# Patient Record
Sex: FEMALE | Race: WHITE | NOT HISPANIC OR LATINO | Employment: FULL TIME | URBAN - METROPOLITAN AREA
[De-identification: names, ages, dates, MRNs, and addresses within clinical notes are randomized per-mention and may not be internally consistent; named-entity substitution may affect disease eponyms.]

---

## 2019-03-07 DIAGNOSIS — M06.9 RHEUMATOID ARTHRITIS, INVOLVING UNSPECIFIED SITE, UNSPECIFIED RHEUMATOID FACTOR PRESENCE: Primary | ICD-10-CM

## 2019-04-08 ENCOUNTER — TELEPHONE (OUTPATIENT)
Dept: RHEUMATOLOGY | Facility: CLINIC | Age: 54
End: 2019-04-08

## 2019-04-08 DIAGNOSIS — G47.00 INSOMNIA, UNSPECIFIED TYPE: Primary | ICD-10-CM

## 2019-04-08 RX ORDER — TEMAZEPAM 30 MG/1
30 CAPSULE ORAL NIGHTLY PRN
Qty: 30 CAPSULE | Refills: 3 | Status: SHIPPED | OUTPATIENT
Start: 2019-04-08 | End: 2019-05-08

## 2019-05-06 ENCOUNTER — OFFICE VISIT (OUTPATIENT)
Dept: RHEUMATOLOGY | Facility: CLINIC | Age: 54
End: 2019-05-06
Payer: COMMERCIAL

## 2019-05-06 VITALS
TEMPERATURE: 98 F | SYSTOLIC BLOOD PRESSURE: 120 MMHG | WEIGHT: 159 LBS | HEIGHT: 63 IN | RESPIRATION RATE: 16 BRPM | HEART RATE: 16 BPM | DIASTOLIC BLOOD PRESSURE: 80 MMHG | BODY MASS INDEX: 28.17 KG/M2

## 2019-05-06 DIAGNOSIS — M25.562 ACUTE PAIN OF LEFT KNEE: ICD-10-CM

## 2019-05-06 DIAGNOSIS — M06.9 RHEUMATOID ARTHRITIS, INVOLVING UNSPECIFIED SITE, UNSPECIFIED RHEUMATOID FACTOR PRESENCE: ICD-10-CM

## 2019-05-06 DIAGNOSIS — M79.7 FIBROSITIS: ICD-10-CM

## 2019-05-06 DIAGNOSIS — E55.9 VITAMIN D DEFICIENCY: ICD-10-CM

## 2019-05-06 DIAGNOSIS — K11.7 DISTURBANCE OF SALIVARY SECRETION: ICD-10-CM

## 2019-05-06 DIAGNOSIS — E87.6 HYPOKALEMIA: Primary | ICD-10-CM

## 2019-05-06 DIAGNOSIS — M75.52 BURSITIS OF LEFT SHOULDER: ICD-10-CM

## 2019-05-06 PROBLEM — B58.00 OCULAR TOXOPLASMOSIS: Status: ACTIVE | Noted: 2019-05-06

## 2019-05-06 PROBLEM — M70.60 TROCHANTERIC BURSITIS: Status: ACTIVE | Noted: 2019-05-06

## 2019-05-06 PROBLEM — M75.50 BURSITIS OF SHOULDER: Status: ACTIVE | Noted: 2019-05-06

## 2019-05-06 PROCEDURE — 99214 OFFICE O/P EST MOD 30 MIN: CPT | Mod: 25,S$GLB,, | Performed by: INTERNAL MEDICINE

## 2019-05-06 PROCEDURE — 3008F BODY MASS INDEX DOCD: CPT | Mod: CPTII,S$GLB,, | Performed by: INTERNAL MEDICINE

## 2019-05-06 PROCEDURE — 20610 DRAIN/INJ JOINT/BURSA W/O US: CPT | Mod: LT,S$GLB,, | Performed by: INTERNAL MEDICINE

## 2019-05-06 PROCEDURE — 99214 PR OFFICE/OUTPT VISIT, EST, LEVL IV, 30-39 MIN: ICD-10-PCS | Mod: 25,S$GLB,, | Performed by: INTERNAL MEDICINE

## 2019-05-06 PROCEDURE — 3008F PR BODY MASS INDEX (BMI) DOCUMENTED: ICD-10-PCS | Mod: CPTII,S$GLB,, | Performed by: INTERNAL MEDICINE

## 2019-05-06 PROCEDURE — 20610 PR DRAIN/INJECT LARGE JOINT/BURSA: ICD-10-PCS | Mod: LT,S$GLB,, | Performed by: INTERNAL MEDICINE

## 2019-05-06 RX ORDER — LEFLUNOMIDE 10 MG/1
TABLET ORAL
COMMUNITY

## 2019-05-06 RX ORDER — DULOXETIN HYDROCHLORIDE 30 MG/1
30 CAPSULE, DELAYED RELEASE ORAL DAILY
Refills: 3 | COMMUNITY
Start: 2019-04-22

## 2019-05-06 RX ORDER — METHYLPREDNISOLONE ACETATE 40 MG/ML
80 INJECTION, SUSPENSION INTRA-ARTICULAR; INTRALESIONAL; INTRAMUSCULAR; SOFT TISSUE
Status: COMPLETED | OUTPATIENT
Start: 2019-05-06 | End: 2019-05-06

## 2019-05-06 RX ORDER — OLMESARTAN MEDOXOMIL 40 MG/1
TABLET ORAL
Refills: 3 | COMMUNITY
Start: 2019-03-08

## 2019-05-06 RX ORDER — METAXALONE 800 MG/1
800 TABLET ORAL 3 TIMES DAILY
Refills: 0 | COMMUNITY
Start: 2019-04-23

## 2019-05-06 RX ORDER — BUTALBITAL, ACETAMINOPHEN AND CAFFEINE 300; 40; 50 MG/1; MG/1; MG/1
CAPSULE ORAL
COMMUNITY

## 2019-05-06 RX ORDER — PANTOPRAZOLE SODIUM 40 MG/1
TABLET, DELAYED RELEASE ORAL
Refills: 3 | COMMUNITY
Start: 2019-05-03 | End: 2019-07-06 | Stop reason: SDUPTHER

## 2019-05-06 RX ORDER — FOLIC ACID 1 MG/1
TABLET ORAL
Qty: 60 TABLET | Refills: 4 | Status: SHIPPED | OUTPATIENT
Start: 2019-05-06 | End: 2020-01-20

## 2019-05-06 RX ORDER — PREDNISONE 10 MG/1
TABLET ORAL
Refills: 4 | COMMUNITY
Start: 2019-04-04 | End: 2020-02-26 | Stop reason: SDUPTHER

## 2019-05-06 RX ORDER — SALSALATE 500 MG/1
TABLET, FILM COATED ORAL
COMMUNITY
End: 2019-07-06 | Stop reason: SDUPTHER

## 2019-05-06 RX ORDER — FOLIC ACID 1 MG/1
TABLET ORAL
Refills: 2 | COMMUNITY
Start: 2019-04-21 | End: 2019-05-06 | Stop reason: SDUPTHER

## 2019-05-06 RX ORDER — PREDNISONE 1 MG/1
TABLET ORAL
Refills: 3 | COMMUNITY
Start: 2019-05-03 | End: 2019-08-26 | Stop reason: SDUPTHER

## 2019-05-06 RX ORDER — HYDROCHLOROTHIAZIDE 12.5 MG/1
TABLET ORAL
Refills: 3 | COMMUNITY
Start: 2019-04-21

## 2019-05-06 RX ORDER — POTASSIUM CHLORIDE 750 MG/1
10 TABLET, EXTENDED RELEASE ORAL DAILY
Qty: 30 TABLET | Refills: 5 | Status: SHIPPED | OUTPATIENT
Start: 2019-05-06 | End: 2020-02-26 | Stop reason: SDUPTHER

## 2019-05-06 RX ORDER — MECLIZINE HYDROCHLORIDE 25 MG/1
TABLET ORAL
COMMUNITY

## 2019-05-06 RX ORDER — CARBOXYMETHYLCELLULOSE SODIUM 10 MG/ML
GEL OPHTHALMIC
COMMUNITY

## 2019-05-06 RX ORDER — LANOLIN ALCOHOL/MO/W.PET/CERES
CREAM (GRAM) TOPICAL
COMMUNITY

## 2019-05-06 RX ORDER — POTASSIUM CHLORIDE 750 MG/1
TABLET, EXTENDED RELEASE ORAL
COMMUNITY
End: 2019-05-06 | Stop reason: SDUPTHER

## 2019-05-06 RX ADMIN — METHYLPREDNISOLONE ACETATE 80 MG: 40 INJECTION, SUSPENSION INTRA-ARTICULAR; INTRALESIONAL; INTRAMUSCULAR; SOFT TISSUE at 10:05

## 2019-05-06 NOTE — PROGRESS NOTES
Subjective:       Patient ID: Amirah Lagos is a 53 y.o. female.    Chief Complaint: Follow-up (with labs)    HPI Continue on Xeljanz 5 mg BID , Leflunomide 10 mg a day n Protonic 40 and zantac at hs, Knee painful and swollen Left worse than right.. Salsalate 500 mg BID  Last wek with nausea and diarrhea but today improved.Rquest injection on lerft Knee because of pain for two weeks. Dryness in eyes and mouth doing better now that she is off of Pylocarpine. No acutely swollen painful inflamed joined landen last visit      Current medications include:  Current Outpatient Medications on File Prior to Visit   Medication Sig Dispense Refill    butalbital-acetaminophen-caff -40 mg Cap butalbital-acetaminophen-caffeine 50 mg-300 mg-40 mg capsule      carboxymethylcellulose sodium (REFRESH CELLUVISC) 1 % DpGe Refresh   once daily in each eye      cyanocobalamin (VITAMIN B-12) 1000 MCG tablet vitamin B12 1,000 mcg-folic acid 400 mcg sublingual tablet   Place 1 tablet every day by sublingual route in the morning.      DULoxetine (CYMBALTA) 30 MG capsule Take 30 mg by mouth once daily.  3    folic acid (FOLVITE) 1 MG tablet TK 2 TS PO QD  2    hydroCHLOROthiazide (HYDRODIURIL) 12.5 MG Tab TK 1 T PO QD IN THE MORNING  3    leflunomide (ARAVA) 10 MG Tab leflunomide 10 mg tablet   TK 1 T PO BID PC      meclizine (ANTIVERT) 25 mg tablet meclizine 25 mg tablet   TK 1 T PO TID PRF DIZZINESS      metaxalone (SKELAXIN) 800 MG tablet Take 800 mg by mouth 3 (three) times daily.  0    multivitamin with minerals tablet Multivitamin 50 Plus tablet   Take 1 tablet every day by oral route.      olmesartan (BENICAR) 40 MG tablet TK 1 T PO QD  3    pantoprazole (PROTONIX) 40 MG tablet TK 1 T PO QD IN THE MORNING  3    potassium chloride (KLOR-CON) 10 MEQ TbSR Take 1 tab 4 times a week      predniSONE (DELTASONE) 1 MG tablet TK 3 TS PO QD  3    predniSONE (DELTASONE) 10 MG tablet Take 1 tab TID x3 days, 1 tab BID x2 days, 1  "tab QD x2 days then off  4    salsalate (DISALCID) 500 MG Tab 1 tab BID      temazepam (RESTORIL) 30 mg capsule Take 1 capsule (30 mg total) by mouth nightly as needed for Insomnia. 30 capsule 3    tofacitinib (XELJANZ) 5 mg Tab Take 5 mg by mouth 2 (two) times daily. 180 tablet 2    tofacitinib (XELJANZ) 5 mg Tab Xeljanz 5 mg tablet       No current facility-administered medications on file prior to visit.        Lab Results:  No results found for: WBC, RBC, HGB, HCT, MCV, COLORU, SPECGRAV, PHUR, WBCUR, NITRITE, GLUCOSEUR, KETONESU, BILIRUBINUR, RBCUR, NA, K, CL, CO2, GLU, BUN, CREATININE     Review of Systems   Constitutional: Positive for unexpected weight change.        Weight gain 8 lbs.   HENT:        Dryness in eyes and mouth improved   Eyes: Negative.    Respiratory: Positive for cough.         Off methotrexat with cough mainly at HS   Cardiovascular: Negative.    Gastrointestinal: Positive for diarrhea and nausea.   Endocrine: Negative.    Musculoskeletal: Positive for arthralgias and back pain.   Allergic/Immunologic: Positive for environmental allergies.   Neurological: Negative.    Hematological: Negative.    Psychiatric/Behavioral: Negative.          Objective:   /80 (BP Location: Right arm, Patient Position: Sitting, BP Method: Small (Manual))   Pulse (!) 16   Temp 98 °F (36.7 °C) (Temporal)   Resp 16   Ht 5' 3" (1.6 m)   Wt 72.1 kg (159 lb)   BMI 28.17 kg/m²      Physical Exam   Constitutional: She is well-developed, well-nourished, and in no distress.   HENT:   Head: Normocephalic and atraumatic.   Mild dryness in mouth   Eyes: Conjunctivae and EOM are normal. Pupils are equal, round, and reactive to light.   Neck: Normal range of motion. Neck supple.   Cardiovascular: Normal rate and regular rhythm.    Pulmonary/Chest: Effort normal and breath sounds normal.   Abdominal: Soft. Bowel sounds are normal.   Neurological:   SLR=negative   Skin: Skin is warm and dry.     Musculoskeletal: "   Hands with ulnar deviation with cool synovial thickening non tensder in wrist and MCP's           Assessment:       1. Rheumatoid arthritis, involving unspecified site, unspecified rheumatoid factor presence    2. Hypokalemia            Plan:       Will inject left knee with Depomedrol 80 IA

## 2019-07-09 RX ORDER — PANTOPRAZOLE SODIUM 40 MG/1
TABLET, DELAYED RELEASE ORAL
Qty: 30 TABLET | Refills: 3 | Status: SHIPPED | OUTPATIENT
Start: 2019-07-09

## 2019-07-09 RX ORDER — SALSALATE 500 MG/1
TABLET, FILM COATED ORAL
Qty: 60 TABLET | Refills: 3 | Status: SHIPPED | OUTPATIENT
Start: 2019-07-09 | End: 2020-02-26 | Stop reason: SDUPTHER

## 2019-08-05 ENCOUNTER — OFFICE VISIT (OUTPATIENT)
Dept: RHEUMATOLOGY | Facility: CLINIC | Age: 54
End: 2019-08-05
Payer: COMMERCIAL

## 2019-08-05 VITALS
TEMPERATURE: 97 F | WEIGHT: 158 LBS | SYSTOLIC BLOOD PRESSURE: 110 MMHG | RESPIRATION RATE: 16 BRPM | HEART RATE: 87 BPM | HEIGHT: 63 IN | BODY MASS INDEX: 28 KG/M2 | DIASTOLIC BLOOD PRESSURE: 70 MMHG

## 2019-08-05 DIAGNOSIS — B58.00 OCULAR TOXOPLASMOSIS: ICD-10-CM

## 2019-08-05 DIAGNOSIS — M06.9 RHEUMATOID ARTHRITIS, INVOLVING UNSPECIFIED SITE, UNSPECIFIED RHEUMATOID FACTOR PRESENCE: Primary | ICD-10-CM

## 2019-08-05 DIAGNOSIS — M79.7 FIBROSITIS: ICD-10-CM

## 2019-08-05 DIAGNOSIS — M75.52 BURSITIS OF LEFT SHOULDER: ICD-10-CM

## 2019-08-05 PROBLEM — R79.89 ABNORMAL LIVER FUNCTION TESTS: Status: ACTIVE | Noted: 2019-08-05

## 2019-08-05 PROCEDURE — 99214 PR OFFICE/OUTPT VISIT, EST, LEVL IV, 30-39 MIN: ICD-10-PCS | Mod: S$GLB,,, | Performed by: INTERNAL MEDICINE

## 2019-08-05 PROCEDURE — 99214 OFFICE O/P EST MOD 30 MIN: CPT | Mod: S$GLB,,, | Performed by: INTERNAL MEDICINE

## 2019-08-05 PROCEDURE — 3008F BODY MASS INDEX DOCD: CPT | Mod: CPTII,S$GLB,, | Performed by: INTERNAL MEDICINE

## 2019-08-05 PROCEDURE — 3008F PR BODY MASS INDEX (BMI) DOCUMENTED: ICD-10-PCS | Mod: CPTII,S$GLB,, | Performed by: INTERNAL MEDICINE

## 2019-08-05 RX ORDER — DEXLANSOPRAZOLE 60 MG/1
1 CAPSULE, DELAYED RELEASE ORAL DAILY
Refills: 0 | COMMUNITY
Start: 2019-07-31

## 2019-08-05 NOTE — PROGRESS NOTES
Subjective:       Patient ID: Amirah Lagos is a 54 y.o. female.    Chief Complaint: Follow-up (with labs)    HPI Continue on Xeljanz 5 mg BID  With no gi upset related to xeljanz . Wrist with achiness and swellingm on and off depending on Physical activity. No intolerance to Leflunomide 10 mg BID and folic acid../ Shoulder not symptomatic        Current medications include:  Current Outpatient Medications on File Prior to Visit   Medication Sig Dispense Refill    butalbital-acetaminophen-caff -40 mg Cap butalbital-acetaminophen-caffeine 50 mg-300 mg-40 mg capsule      carboxymethylcellulose sodium (REFRESH CELLUVISC) 1 % DpGe Refresh   once daily in each eye      cyanocobalamin (VITAMIN B-12) 1000 MCG tablet vitamin B12 1,000 mcg-folic acid 400 mcg sublingual tablet   Place 1 tablet every day by sublingual route in the morning.      DEXILANT 60 mg capsule Take 1 capsule by mouth once daily.  0    DULoxetine (CYMBALTA) 30 MG capsule Take 30 mg by mouth once daily.  3    folic acid (FOLVITE) 1 MG tablet TK 2 TS PO QD 60 tablet 4    hydroCHLOROthiazide (HYDRODIURIL) 12.5 MG Tab TK 1 T PO QD IN THE MORNING  3    leflunomide (ARAVA) 10 MG Tab leflunomide 10 mg tablet   TK 1 T PO BID PC      meclizine (ANTIVERT) 25 mg tablet meclizine 25 mg tablet   TK 1 T PO TID PRF DIZZINESS      metaxalone (SKELAXIN) 800 MG tablet Take 800 mg by mouth 3 (three) times daily.  0    multivitamin with minerals tablet Multivitamin 50 Plus tablet   Take 1 tablet every day by oral route.      olmesartan (BENICAR) 40 MG tablet TK 1 T PO QD  3    pantoprazole (PROTONIX) 40 MG tablet TAKE 1 TABLET BY MOUTH EVERY DAY IN THE MORNING 30 tablet 3    potassium chloride (KLOR-CON) 10 MEQ TbSR Take 1 tablet (10 mEq total) by mouth once daily. Take 1 tab 4 times a week 30 tablet 5    predniSONE (DELTASONE) 1 MG tablet TK 3 TS PO QD  3    predniSONE (DELTASONE) 10 MG tablet Take 1 tab TID x3 days, 1 tab BID x2 days, 1 tab QD x2  "days then off  4    salsalate (DISALCID) 500 MG Tab TAKE ONE TABLET BY MOUTH TWICE DAILY 60 tablet 3    tofacitinib (XELJANZ) 5 mg Tab Take 5 mg by mouth 2 (two) times daily. 180 tablet 2    [DISCONTINUED] tofacitinib (XELJANZ) 5 mg Tab Xeljanz 5 mg tablet       No current facility-administered medications on file prior to visit.        Lab Results:  No results found for: WBC, RBC, HGB, HCT, MCV, COLORU, SPECGRAV, PHUR, WBCUR, NITRITE, GLUCOSEUR, KETONESU, BILIRUBINUR, RBCUR, NA, K, CL, CO2, GLU, BUN, CREATININE     Review of Systems   Constitutional: Negative.    HENT:        Dryness in eyes and mouth   Eyes: Negative.    Respiratory: Negative.    Cardiovascular: Negative.    Gastrointestinal:        Heartburns- ,diarrhea   Endocrine: Negative.    Genitourinary: Negative.    Musculoskeletal: Positive for arthralgias and back pain.   Allergic/Immunologic: Positive for environmental allergies.   Neurological: Negative.    Hematological: Negative.         Hx of anemia in the past.   Psychiatric/Behavioral: Negative.          Objective:   /70 (BP Location: Right arm, Patient Position: Sitting, BP Method: Small (Manual))   Pulse 87   Temp 97.4 °F (36.3 °C) (Temporal)   Resp 16   Ht 5' 3" (1.6 m)   Wt 71.7 kg (158 lb)   BMI 27.99 kg/m²      Physical Exam   Constitutional: She is oriented to person, place, and time and well-developed, well-nourished, and in no distress.   HENT:   Head: Normocephalic and atraumatic.   No dryness in eyes and mouth   Eyes: EOM are normal. Pupils are equal, round, and reactive to light.   Neck: Normal range of motion. Neck supple.   Cardiovascular: Normal rate, regular rhythm and normal heart sounds.    Pulmonary/Chest: Effort normal and breath sounds normal.   Abdominal: Soft.   Neurological: She is alert and oriented to person, place, and time.   SLR=negative   Skin: Skin is warm and dry.     Musculoskeletal:   Hands with coolsynovial thickening no tenderness can make a fist " 100 5           Assessment:       1. Rheumatoid arthritis, involving unspecified site, unspecified rheumatoid factor presence    2. Fibrositis    3. Bursitis of left shoulder    4. Ocular toxoplasmosis            Plan:       Continue current medication xeljanz helping fatigue and arthritis.

## 2019-08-27 RX ORDER — PREDNISONE 1 MG/1
TABLET ORAL
Qty: 90 TABLET | Refills: 4 | Status: SHIPPED | OUTPATIENT
Start: 2019-08-27 | End: 2020-02-26 | Stop reason: SDUPTHER

## 2019-09-23 RX ORDER — TEMAZEPAM 30 MG/1
CAPSULE ORAL
Qty: 30 CAPSULE | Refills: 2 | Status: SHIPPED | OUTPATIENT
Start: 2019-09-23

## 2019-11-20 ENCOUNTER — PATIENT MESSAGE (OUTPATIENT)
Dept: RHEUMATOLOGY | Facility: CLINIC | Age: 54
End: 2019-11-20

## 2019-11-26 ENCOUNTER — OFFICE VISIT (OUTPATIENT)
Dept: RHEUMATOLOGY | Facility: CLINIC | Age: 54
End: 2019-11-26
Payer: COMMERCIAL

## 2019-11-26 VITALS
OXYGEN SATURATION: 98 % | HEART RATE: 84 BPM | WEIGHT: 152 LBS | RESPIRATION RATE: 16 BRPM | TEMPERATURE: 97 F | DIASTOLIC BLOOD PRESSURE: 70 MMHG | HEIGHT: 63 IN | SYSTOLIC BLOOD PRESSURE: 110 MMHG | BODY MASS INDEX: 26.93 KG/M2

## 2019-11-26 DIAGNOSIS — M06.9 RHEUMATOID ARTHRITIS, INVOLVING UNSPECIFIED SITE, UNSPECIFIED RHEUMATOID FACTOR PRESENCE: Primary | ICD-10-CM

## 2019-11-26 DIAGNOSIS — M70.62 TROCHANTERIC BURSITIS OF BOTH HIPS: ICD-10-CM

## 2019-11-26 DIAGNOSIS — K11.7 DISTURBANCE OF SALIVARY SECRETION: ICD-10-CM

## 2019-11-26 DIAGNOSIS — M75.52 BURSITIS OF LEFT SHOULDER: ICD-10-CM

## 2019-11-26 DIAGNOSIS — M70.61 TROCHANTERIC BURSITIS OF BOTH HIPS: ICD-10-CM

## 2019-11-26 DIAGNOSIS — E55.9 VITAMIN D DEFICIENCY: ICD-10-CM

## 2019-11-26 DIAGNOSIS — B58.00 OCULAR TOXOPLASMOSIS: ICD-10-CM

## 2019-11-26 DIAGNOSIS — Z79.899 HISTORY OF ONGOING TREATMENT WITH HIGH-RISK MEDICATION: ICD-10-CM

## 2019-11-26 PROCEDURE — 3008F BODY MASS INDEX DOCD: CPT | Mod: CPTII,S$GLB,, | Performed by: INTERNAL MEDICINE

## 2019-11-26 PROCEDURE — 99214 PR OFFICE/OUTPT VISIT, EST, LEVL IV, 30-39 MIN: ICD-10-PCS | Mod: S$GLB,,, | Performed by: INTERNAL MEDICINE

## 2019-11-26 PROCEDURE — 3008F PR BODY MASS INDEX (BMI) DOCUMENTED: ICD-10-PCS | Mod: CPTII,S$GLB,, | Performed by: INTERNAL MEDICINE

## 2019-11-26 PROCEDURE — 99214 OFFICE O/P EST MOD 30 MIN: CPT | Mod: S$GLB,,, | Performed by: INTERNAL MEDICINE

## 2019-11-26 NOTE — PROGRESS NOTES
Subjective:       Patient ID: Amirah Lagos is a 54 y.o. female.    Chief Complaint: Follow-up (with labs)    HPI Has a chronic cough for a year  When off Methotrexate it got better  And advised to hold Leflunomide  To see effect on the cough and  Pervious CXRay it was clear. If she is intolerant to Leflunomide will give her Azulfidine. No acutely swollen tendr painful or tender joints . Occasional rt lopwer back pain. Dryness in eyes is worse at  Night . Has not taken Restasis drops. Cleared by ophthalmologist yearly and ocular toxoplasmosis not active. Dryness in mouth not as bad Shoulders is better and hips flare up occasionally. Continue on vit D and calcium.        Current medications include:  Current Outpatient Medications on File Prior to Visit   Medication Sig Dispense Refill    butalbital-acetaminophen-caff -40 mg Cap butalbital-acetaminophen-caffeine 50 mg-300 mg-40 mg capsule      carboxymethylcellulose sodium (REFRESH CELLUVISC) 1 % DpGe Refresh   once daily in each eye      cyanocobalamin (VITAMIN B-12) 1000 MCG tablet vitamin B12 1,000 mcg-folic acid 400 mcg sublingual tablet   Place 1 tablet every day by sublingual route in the morning.      DEXILANT 60 mg capsule Take 1 capsule by mouth once daily.  0    DULoxetine (CYMBALTA) 30 MG capsule Take 30 mg by mouth once daily.  3    folic acid (FOLVITE) 1 MG tablet TK 2 TS PO QD 60 tablet 4    hydroCHLOROthiazide (HYDRODIURIL) 12.5 MG Tab TK 1 T PO QD IN THE MORNING  3    leflunomide (ARAVA) 10 MG Tab leflunomide 10 mg tablet   TK 1 T PO BID PC      meclizine (ANTIVERT) 25 mg tablet meclizine 25 mg tablet   TK 1 T PO TID PRF DIZZINESS      metaxalone (SKELAXIN) 800 MG tablet Take 800 mg by mouth 3 (three) times daily.  0    multivitamin with minerals tablet Multivitamin 50 Plus tablet   Take 1 tablet every day by oral route.      olmesartan (BENICAR) 40 MG tablet TK 1 T PO QD  3    pantoprazole (PROTONIX) 40 MG tablet TAKE 1 TABLET BY  "MOUTH EVERY DAY IN THE MORNING 30 tablet 3    potassium chloride (KLOR-CON) 10 MEQ TbSR Take 1 tablet (10 mEq total) by mouth once daily. Take 1 tab 4 times a week 30 tablet 5    predniSONE (DELTASONE) 1 MG tablet TAKE 3 TABLETS BY MOUTH EVERY DAY 90 tablet 4    predniSONE (DELTASONE) 10 MG tablet Take 1 tab TID x3 days, 1 tab BID x2 days, 1 tab QD x2 days then off  4    salsalate (DISALCID) 500 MG Tab TAKE ONE TABLET BY MOUTH TWICE DAILY 60 tablet 3    temazepam (RESTORIL) 30 mg capsule TAKE ONE CAPSULE BY MOUTH NIGHTLY AS NEEDED FOR INSOMNIA 30 capsule 2    tofacitinib (XELJANZ) 5 mg Tab Take 5 mg by mouth 2 (two) times daily. 180 tablet 2     No current facility-administered medications on file prior to visit.        Lab Results:  No results found for: WBC, RBC, HGB, HCT, MCV, COLORU, SPECGRAV, PHUR, WBCUR, NITRITE, GLUCOSEUR, KETONESU, BILIRUBINUR, RBCUR, NA, K, CL, CO2, GLU, BUN, CREATININE     Review of Systems   Constitutional:        2 weeks a ago of medicines with N/V    HENT:        Drynessm in eyes > than mouth on refresh pm   Eyes: Negative.    Respiratory: Positive for cough.    Cardiovascular: Negative.    Gastrointestinal: Positive for diarrhea.   Endocrine: Negative.    Genitourinary: Negative.    Musculoskeletal: Positive for arthralgias and back pain.   Allergic/Immunologic: Positive for environmental allergies.   Neurological: Negative.    Hematological:        Hx of anemia chroically   Psychiatric/Behavioral: Negative.          Objective:   /70 (BP Location: Right arm, Patient Position: Sitting, BP Method: Medium (Manual))   Pulse 84   Temp 96.5 °F (35.8 °C) (Temporal)   Resp 16   Ht 5' 3" (1.6 m)   Wt 68.9 kg (152 lb)   SpO2 98%   BMI 26.93 kg/m²      Physical Exam   Constitutional: She is oriented to person, place, and time and well-developed, well-nourished, and in no distress.   HENT:   Dryness in the eyes   Eyes: Conjunctivae and EOM are normal. Pupils are equal, round, " and reactive to light.   Neck: Normal range of motion. Neck supple.   Cardiovascular: Normal rate and regular rhythm.    Pulmonary/Chest: Effort normal and breath sounds normal.   Abdominal: Soft. Bowel sounds are normal.   Neurological: She is alert and oriented to person, place, and time. Gait normal.   SLR=negative   Skin: Skin is warm and dry.     Psychiatric: Mood, memory, affect and judgment normal.   Musculoskeletal:   Hand with cool ,non tender af bilateral hands MCP  With ulder dviation Rt> left elbos no sq node no tenderness or swelling           Assessment:       1. Rheumatoid arthritis, involving unspecified site, unspecified rheumatoid factor presence    2. Bursitis of left shoulder    3. Trochanteric bursitis of both hips    4. Disturbance of salivary secretion    5. Ocular toxoplasmosis    6. Vitamin D deficiency            Plan:       RA low activity will add restasis for xerophthalmia.Hgb went up to 12.8

## 2020-01-20 DIAGNOSIS — M06.9 RHEUMATOID ARTHRITIS, INVOLVING UNSPECIFIED SITE, UNSPECIFIED RHEUMATOID FACTOR PRESENCE: ICD-10-CM

## 2020-01-20 RX ORDER — FOLIC ACID 1 MG/1
TABLET ORAL
Qty: 60 TABLET | Refills: 4 | Status: SHIPPED | OUTPATIENT
Start: 2020-01-20 | End: 2020-02-26 | Stop reason: SDUPTHER

## 2020-02-14 RX ORDER — TEMAZEPAM 30 MG/1
CAPSULE ORAL
Qty: 30 CAPSULE | Refills: 2 | OUTPATIENT
Start: 2020-02-14

## 2020-02-25 DIAGNOSIS — M06.9 RHEUMATOID ARTHRITIS, INVOLVING UNSPECIFIED SITE, UNSPECIFIED RHEUMATOID FACTOR PRESENCE: ICD-10-CM

## 2020-02-26 ENCOUNTER — OFFICE VISIT (OUTPATIENT)
Dept: RHEUMATOLOGY | Facility: CLINIC | Age: 55
End: 2020-02-26
Payer: COMMERCIAL

## 2020-02-26 VITALS
RESPIRATION RATE: 16 BRPM | DIASTOLIC BLOOD PRESSURE: 83 MMHG | OXYGEN SATURATION: 98 % | HEART RATE: 81 BPM | TEMPERATURE: 96 F | SYSTOLIC BLOOD PRESSURE: 119 MMHG | BODY MASS INDEX: 27.82 KG/M2 | WEIGHT: 157 LBS | HEIGHT: 63 IN

## 2020-02-26 DIAGNOSIS — K11.7 DISTURBANCE OF SALIVARY SECRETION: ICD-10-CM

## 2020-02-26 DIAGNOSIS — M79.7 FIBROSITIS: ICD-10-CM

## 2020-02-26 DIAGNOSIS — M06.9 RHEUMATOID ARTHRITIS, INVOLVING UNSPECIFIED SITE, UNSPECIFIED RHEUMATOID FACTOR PRESENCE: Primary | ICD-10-CM

## 2020-02-26 DIAGNOSIS — M25.562 ACUTE PAIN OF LEFT KNEE: ICD-10-CM

## 2020-02-26 DIAGNOSIS — Z79.899 HISTORY OF ONGOING TREATMENT WITH HIGH-RISK MEDICATION: ICD-10-CM

## 2020-02-26 DIAGNOSIS — B58.00 OCULAR TOXOPLASMOSIS: ICD-10-CM

## 2020-02-26 DIAGNOSIS — E87.6 HYPOKALEMIA: ICD-10-CM

## 2020-02-26 DIAGNOSIS — E55.9 VITAMIN D DEFICIENCY: ICD-10-CM

## 2020-02-26 PROCEDURE — 99214 PR OFFICE/OUTPT VISIT, EST, LEVL IV, 30-39 MIN: ICD-10-PCS | Mod: S$GLB,,, | Performed by: INTERNAL MEDICINE

## 2020-02-26 PROCEDURE — 3008F PR BODY MASS INDEX (BMI) DOCUMENTED: ICD-10-PCS | Mod: CPTII,S$GLB,, | Performed by: INTERNAL MEDICINE

## 2020-02-26 PROCEDURE — 99214 OFFICE O/P EST MOD 30 MIN: CPT | Mod: S$GLB,,, | Performed by: INTERNAL MEDICINE

## 2020-02-26 PROCEDURE — 3008F BODY MASS INDEX DOCD: CPT | Mod: CPTII,S$GLB,, | Performed by: INTERNAL MEDICINE

## 2020-02-26 RX ORDER — PREDNISONE 10 MG/1
10 TABLET ORAL
Qty: 15 TABLET | Refills: 4 | Status: SHIPPED | OUTPATIENT
Start: 2020-02-26

## 2020-02-26 RX ORDER — TOFACITINIB 5 MG/1
TABLET, FILM COATED ORAL
Qty: 60 TABLET | Refills: 5 | Status: SHIPPED | OUTPATIENT
Start: 2020-02-26

## 2020-02-26 RX ORDER — POTASSIUM CHLORIDE 750 MG/1
10 TABLET, EXTENDED RELEASE ORAL DAILY
Qty: 30 TABLET | Refills: 5 | Status: SHIPPED | OUTPATIENT
Start: 2020-02-26

## 2020-02-26 RX ORDER — FOLIC ACID 1 MG/1
TABLET ORAL
Qty: 60 TABLET | Refills: 4 | Status: SHIPPED | OUTPATIENT
Start: 2020-02-26

## 2020-02-26 RX ORDER — SALSALATE 500 MG/1
500 TABLET, FILM COATED ORAL 2 TIMES DAILY
Qty: 60 TABLET | Refills: 4 | Status: SHIPPED | OUTPATIENT
Start: 2020-02-26 | End: 2020-07-21 | Stop reason: SDUPTHER

## 2020-02-26 RX ORDER — PREDNISONE 1 MG/1
3 TABLET ORAL DAILY
Qty: 90 TABLET | Refills: 4 | Status: SHIPPED | OUTPATIENT
Start: 2020-02-26 | End: 2020-07-22 | Stop reason: SDUPTHER

## 2020-02-26 NOTE — PROGRESS NOTES
Subjective:       Patient ID: Amirah Lagos is a 54 y.o. female.    Chief Complaint: Follow-up (with lab results)    HPI Tolerating the xeljanz 5 mg bid , salsalate 750 mg Bid .Am stiffnmess  And fatigue onset 6 hrs .; Has pain not inflammation   other peripjheral joint affected  With pain . Takes leflumnomide 10 mg bid with no gi side effects. Has dryness in eyes helped bysteroid eye drops  .         Current medications include:  Current Outpatient Medications on File Prior to Visit   Medication Sig Dispense Refill    butalbital-acetaminophen-caff -40 mg Cap butalbital-acetaminophen-caffeine 50 mg-300 mg-40 mg capsule      carboxymethylcellulose sodium (REFRESH CELLUVISC) 1 % DpGe Refresh   once daily in each eye      cyanocobalamin (VITAMIN B-12) 1000 MCG tablet vitamin B12 1,000 mcg-folic acid 400 mcg sublingual tablet   Place 1 tablet every day by sublingual route in the morning.      DEXILANT 60 mg capsule Take 1 capsule by mouth once daily.  0    DULoxetine (CYMBALTA) 30 MG capsule Take 30 mg by mouth once daily.  3    folic acid (FOLVITE) 1 MG tablet TAKE 2 TABLETS BY MOUTH EVERY DAY 60 tablet 4    hydroCHLOROthiazide (HYDRODIURIL) 12.5 MG Tab TK 1 T PO QD IN THE MORNING  3    leflunomide (ARAVA) 10 MG Tab leflunomide 10 mg tablet   TK 1 T PO BID PC      meclizine (ANTIVERT) 25 mg tablet meclizine 25 mg tablet   TK 1 T PO TID PRF DIZZINESS      metaxalone (SKELAXIN) 800 MG tablet Take 800 mg by mouth 3 (three) times daily.  0    multivitamin with minerals tablet Multivitamin 50 Plus tablet   Take 1 tablet every day by oral route.      olmesartan (BENICAR) 40 MG tablet TK 1 T PO QD  3    pantoprazole (PROTONIX) 40 MG tablet TAKE 1 TABLET BY MOUTH EVERY DAY IN THE MORNING 30 tablet 3    potassium chloride (KLOR-CON) 10 MEQ TbSR Take 1 tablet (10 mEq total) by mouth once daily. Take 1 tab 4 times a week 30 tablet 5    predniSONE (DELTASONE) 1 MG tablet TAKE 3 TABLETS BY MOUTH EVERY DAY 90  "tablet 4    predniSONE (DELTASONE) 10 MG tablet Take 1 tab TID x3 days, 1 tab BID x2 days, 1 tab QD x2 days then off  4    salsalate (DISALCID) 500 MG Tab TAKE ONE TABLET BY MOUTH TWICE DAILY 60 tablet 3    temazepam (RESTORIL) 30 mg capsule TAKE ONE CAPSULE BY MOUTH NIGHTLY AS NEEDED FOR INSOMNIA 30 capsule 2    XELJANZ 5 mg Tab TAKE 1 TABLET BY MOUTH  TWICE A DAY 60 tablet 5     No current facility-administered medications on file prior to visit.        Lab Results:  No results found for: WBC, RBC, HGB, HCT, MCV, COLORU, SPECGRAV, PHUR, WBCUR, NITRITE, GLUCOSEUR, KETONESU, BILIRUBINUR, RBCUR, NA, K, CL, CO2, GLU, BUN, CREATININE     Review of Systems   Constitutional: Negative.    HENT:        Dryness in the eyes and mouth   Eyes: Negative.    Respiratory: Positive for cough.    Cardiovascular: Negative.    Gastrointestinal: Negative.    Endocrine: Negative.    Genitourinary: Negative.    Musculoskeletal: Positive for arthralgias and back pain.   Allergic/Immunologic: Positive for environmental allergies.   Neurological: Negative.    Hematological:        Iron deficit anemia   Psychiatric/Behavioral: Negative.          Objective:   /83 (BP Location: Left arm, Patient Position: Sitting, BP Method: Medium (Automatic))   Pulse 81   Temp 96.3 °F (35.7 °C) (Temporal)   Resp 16   Ht 5' 3" (1.6 m)   Wt 71.2 kg (157 lb)   SpO2 98%   BMI 27.81 kg/m²      Physical Exam   Constitutional: She is oriented to person, place, and time and well-developed, well-nourished, and in no distress.   HENT:   Head: Normocephalic and atraumatic.   Eyes: Conjunctivae and EOM are normal. Pupils are equal, round, and reactive to light.   Neck: Normal range of motion.   Cardiovascular: Normal rate, regular rhythm and normal heart sounds.    Pulmonary/Chest: Effort normal and breath sounds normal.   Abdominal: Soft.   Neurological: She is alert and oriented to person, place, and time. Gait normal.   SLR = negative   Skin: Skin " is warm and dry.     Psychiatric: Mood, memory, affect and judgment normal.   Musculoskeletal:   Has cool synovium nonn tender wit ulnar deviation more pronounced on the right side            Assessment:       1. Acute pain of left knee    2. Hypokalemia    3. Rheumatoid arthritis, involving unspecified site, unspecified rheumatoid factor presence    4. Fibrositis    5. Vitamin D deficiency    6. 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 Disturbance of salivary secretion            Plan:       Will continue current medicatiions and dosis

## 2020-05-26 ENCOUNTER — OFFICE VISIT (OUTPATIENT)
Dept: RHEUMATOLOGY | Facility: CLINIC | Age: 55
End: 2020-05-26
Payer: COMMERCIAL

## 2020-05-26 VITALS
BODY MASS INDEX: 29.59 KG/M2 | SYSTOLIC BLOOD PRESSURE: 153 MMHG | DIASTOLIC BLOOD PRESSURE: 98 MMHG | WEIGHT: 167 LBS | HEIGHT: 63 IN | RESPIRATION RATE: 16 BRPM | HEART RATE: 80 BPM | TEMPERATURE: 96 F

## 2020-05-26 DIAGNOSIS — M75.52 BURSITIS OF LEFT SHOULDER: ICD-10-CM

## 2020-05-26 DIAGNOSIS — M70.62 TROCHANTERIC BURSITIS OF BOTH HIPS: ICD-10-CM

## 2020-05-26 DIAGNOSIS — K11.7 DISTURBANCE OF SALIVARY SECRETION: ICD-10-CM

## 2020-05-26 DIAGNOSIS — M70.61 TROCHANTERIC BURSITIS OF BOTH HIPS: ICD-10-CM

## 2020-05-26 DIAGNOSIS — M06.9 RHEUMATOID ARTHRITIS, INVOLVING UNSPECIFIED SITE, UNSPECIFIED RHEUMATOID FACTOR PRESENCE: Primary | ICD-10-CM

## 2020-05-26 PROCEDURE — 99214 OFFICE O/P EST MOD 30 MIN: CPT | Mod: S$GLB,,, | Performed by: INTERNAL MEDICINE

## 2020-05-26 PROCEDURE — 3008F PR BODY MASS INDEX (BMI) DOCUMENTED: ICD-10-PCS | Mod: CPTII,S$GLB,, | Performed by: INTERNAL MEDICINE

## 2020-05-26 PROCEDURE — 3008F BODY MASS INDEX DOCD: CPT | Mod: CPTII,S$GLB,, | Performed by: INTERNAL MEDICINE

## 2020-05-26 PROCEDURE — 99214 PR OFFICE/OUTPT VISIT, EST, LEVL IV, 30-39 MIN: ICD-10-PCS | Mod: S$GLB,,, | Performed by: INTERNAL MEDICINE

## 2020-05-26 NOTE — PROGRESS NOTES
Subjective:       Patient ID: Amirah Lagos is a 54 y.o. female.    Chief Complaint: Follow-up (with lab results)    HPILeflunomide stopped and dry cough improved . No temnder swollen joint and continue on cymbalta. Hip, bursitis not symptomaticm, and left knee and hip grater trochanter bursitis doing well pain mnot symptomatic        Current medications include:  Current Outpatient Medications on File Prior to Visit   Medication Sig Dispense Refill    butalbital-acetaminophen-caff -40 mg Cap butalbital-acetaminophen-caffeine 50 mg-300 mg-40 mg capsule      carboxymethylcellulose sodium (REFRESH CELLUVISC) 1 % DpGe Refresh   once daily in each eye      cyanocobalamin (VITAMIN B-12) 1000 MCG tablet vitamin B12 1,000 mcg-folic acid 400 mcg sublingual tablet   Place 1 tablet every day by sublingual route in the morning.      DEXILANT 60 mg capsule Take 1 capsule by mouth once daily.  0    DULoxetine (CYMBALTA) 30 MG capsule Take 30 mg by mouth once daily.  3    folic acid (FOLVITE) 1 MG tablet TAKE 2 TABLETS BY MOUTH EVERY DAY 60 tablet 4    hydroCHLOROthiazide (HYDRODIURIL) 12.5 MG Tab TK 1 T PO QD IN THE MORNING  3    leflunomide (ARAVA) 10 MG Tab leflunomide 10 mg tablet   TK 1 T PO BID PC      meclizine (ANTIVERT) 25 mg tablet meclizine 25 mg tablet   TK 1 T PO TID PRF DIZZINESS      metaxalone (SKELAXIN) 800 MG tablet Take 800 mg by mouth 3 (three) times daily.  0    multivitamin with minerals tablet Multivitamin 50 Plus tablet   Take 1 tablet every day by oral route.      olmesartan (BENICAR) 40 MG tablet TK 1 T PO QD  3    pantoprazole (PROTONIX) 40 MG tablet TAKE 1 TABLET BY MOUTH EVERY DAY IN THE MORNING 30 tablet 3    potassium chloride (KLOR-CON) 10 MEQ TbSR Take 1 tablet (10 mEq total) by mouth once daily. Take 1 tab 4 times a week 30 tablet 5    predniSONE (DELTASONE) 1 MG tablet Take 3 tablets (3 mg total) by mouth once daily. 90 tablet 4    predniSONE (DELTASONE) 10 MG tablet Take  "1 tablet (10 mg total) by mouth as needed. Take 1 tab TID x3 days, 1 tab BID x2 days, 1 tab QD x2 days then off 15 tablet 4    salsalate (DISALCID) 500 MG Tab Take 1 tablet (500 mg total) by mouth 2 (two) times daily. 60 tablet 4    temazepam (RESTORIL) 30 mg capsule TAKE ONE CAPSULE BY MOUTH NIGHTLY AS NEEDED FOR INSOMNIA 30 capsule 2    XELJANZ 5 mg Tab TAKE 1 TABLET BY MOUTH  TWICE A DAY 60 tablet 5     No current facility-administered medications on file prior to visit.        Lab Results:  No results found for: WBC, RBC, HGB, HCT, MCV, COLORU, SPECGRAV, PHUR, WBCUR, NITRITE, GLUCOSEUR, KETONESU, BILIRUBINUR, RBCUR, NA, K, CL, CO2, GLU, BUN, CREATININE     Review of Systems   Constitutional: Negative.    HENT:        Drynes in eyes and mouth using artificial tear drops   Eyes: Negative.    Respiratory: Negative.    Cardiovascular: Negative.    Gastrointestinal: Negative.         Heart burns on dexilant   Endocrine: Negative.    Genitourinary: Negative.    Musculoskeletal: Positive for arthralgias and back pain.   Allergic/Immunologic: Positive for environmental allergies.   Neurological: Negative.    Hematological: Negative.    Psychiatric/Behavioral: Negative.          Objective:   BP (!) 153/98 (BP Location: Left arm, Patient Position: Sitting, BP Method: Medium (Automatic))   Pulse 80   Temp 96.3 °F (35.7 °C) (Temporal)   Resp 16   Ht 5' 3" (1.6 m)   Wt 75.8 kg (167 lb)   BMI 29.58 kg/m²      Physical Exam   Constitutional: She is oriented to person, place, and time and well-developed, well-nourished, and in no distress.   HENT:   Head: Normocephalic and atraumatic.   No dryness inn eyes   Eyes: Conjunctivae and EOM are normal. Pupils are equal, round, and reactive to light.   Neck: Normal range of motion.   Cardiovascular: Normal rate and normal heart sounds.    Pulmonary/Chest: Effort normal and breath sounds normal.   Abdominal: Soft.   Neurological: She is alert and oriented to person, place, and " time. Gait normal.   SLR=negative   Skin: Skin is warm and dry.     Psychiatric: Mood, memory, affect and judgment normal.   Musculoskeletal: Normal range of motion.   Tino with cool non tender synovial thicken in 2 nd and rd mcp bilaterally wit ulnar drift of Mcp.           Assessment:       1. Rheumatoid arthritis, involving unspecified site, unspecified rheumatoid factor presence    2. Trochanteric bursitis of both hips    3. Bursitis of left shoulder    4. Disturbance of salivary secretion            Plan:        will continue current medications and dosis

## 2020-07-21 DIAGNOSIS — M06.9 RHEUMATOID ARTHRITIS, INVOLVING UNSPECIFIED SITE, UNSPECIFIED RHEUMATOID FACTOR PRESENCE: ICD-10-CM

## 2020-07-21 RX ORDER — SALSALATE 500 MG/1
500 TABLET, FILM COATED ORAL 2 TIMES DAILY
Qty: 60 TABLET | Refills: 1 | Status: SHIPPED | OUTPATIENT
Start: 2020-07-21

## 2020-07-22 DIAGNOSIS — Z79.899 HISTORY OF ONGOING TREATMENT WITH HIGH-RISK MEDICATION: ICD-10-CM

## 2020-07-22 DIAGNOSIS — M06.9 RHEUMATOID ARTHRITIS, INVOLVING UNSPECIFIED SITE, UNSPECIFIED RHEUMATOID FACTOR PRESENCE: ICD-10-CM

## 2020-07-22 RX ORDER — PREDNISONE 1 MG/1
3 TABLET ORAL DAILY
Qty: 90 TABLET | Refills: 1 | Status: SHIPPED | OUTPATIENT
Start: 2020-07-22

## 2021-05-12 ENCOUNTER — PATIENT MESSAGE (OUTPATIENT)
Dept: RESEARCH | Facility: HOSPITAL | Age: 56
End: 2021-05-12

## 2024-01-16 DIAGNOSIS — Z12.31 ENCOUNTER FOR SCREENING MAMMOGRAM FOR MALIGNANT NEOPLASM OF BREAST: Primary | ICD-10-CM

## 2024-03-18 ENCOUNTER — HOSPITAL ENCOUNTER (OUTPATIENT)
Dept: RADIOLOGY | Facility: HOSPITAL | Age: 59
Discharge: HOME | End: 2024-03-18
Attending: FAMILY MEDICINE
Payer: COMMERCIAL

## 2024-03-18 DIAGNOSIS — Z12.31 ENCOUNTER FOR SCREENING MAMMOGRAM FOR MALIGNANT NEOPLASM OF BREAST: ICD-10-CM

## 2024-03-18 PROCEDURE — 77063 BREAST TOMOSYNTHESIS BI: CPT | Mod: 26,,, | Performed by: STUDENT IN AN ORGANIZED HEALTH CARE EDUCATION/TRAINING PROGRAM

## 2024-03-18 PROCEDURE — 77067 SCR MAMMO BI INCL CAD: CPT | Mod: TC

## 2024-03-18 PROCEDURE — 77067 SCR MAMMO BI INCL CAD: CPT | Mod: 26,,, | Performed by: STUDENT IN AN ORGANIZED HEALTH CARE EDUCATION/TRAINING PROGRAM

## 2024-04-16 ENCOUNTER — HOSPITAL ENCOUNTER (OUTPATIENT)
Dept: RADIOLOGY | Facility: HOSPITAL | Age: 59
Discharge: HOME | End: 2024-04-16
Attending: FAMILY MEDICINE
Payer: COMMERCIAL

## 2024-04-16 DIAGNOSIS — R92.8 ABNORMAL MAMMOGRAM: ICD-10-CM

## 2024-04-16 PROCEDURE — 77066 DX MAMMO INCL CAD BI: CPT | Mod: TC

## 2024-04-16 PROCEDURE — 77062 BREAST TOMOSYNTHESIS BI: CPT | Mod: 26,,, | Performed by: STUDENT IN AN ORGANIZED HEALTH CARE EDUCATION/TRAINING PROGRAM

## 2024-04-16 PROCEDURE — 77066 DX MAMMO INCL CAD BI: CPT | Mod: 26,,, | Performed by: STUDENT IN AN ORGANIZED HEALTH CARE EDUCATION/TRAINING PROGRAM

## 2024-11-18 ENCOUNTER — HOSPITAL ENCOUNTER (OUTPATIENT)
Dept: RADIOLOGY | Facility: HOSPITAL | Age: 59
Discharge: HOME | End: 2024-11-18
Attending: FAMILY MEDICINE
Payer: COMMERCIAL

## 2024-11-18 DIAGNOSIS — R05.8 OTHER SPECIFIED COUGH: ICD-10-CM

## 2024-11-18 PROCEDURE — 71046 X-RAY EXAM CHEST 2 VIEWS: CPT | Mod: TC

## 2024-12-02 DIAGNOSIS — R10.84 ABDOMINAL PAIN, GENERALIZED: Primary | ICD-10-CM

## 2024-12-09 ENCOUNTER — HOSPITAL ENCOUNTER (OUTPATIENT)
Dept: RADIOLOGY | Facility: HOSPITAL | Age: 59
Discharge: HOME | End: 2024-12-09
Attending: FAMILY MEDICINE
Payer: COMMERCIAL

## 2024-12-09 DIAGNOSIS — R10.84 ABDOMINAL PAIN, GENERALIZED: ICD-10-CM

## 2024-12-09 PROCEDURE — 74176 CT ABD & PELVIS W/O CONTRAST: CPT | Mod: TC

## 2024-12-09 PROCEDURE — 25500020 PHARM REV CODE 255: Performed by: FAMILY MEDICINE

## 2024-12-09 RX ORDER — DIATRIZOATE MEGLUMINE AND DIATRIZOATE SODIUM 660; 100 MG/ML; MG/ML
30 SOLUTION ORAL; RECTAL
Status: COMPLETED | OUTPATIENT
Start: 2024-12-09 | End: 2024-12-09

## 2024-12-09 RX ADMIN — DIATRIZOATE MEGLUMINE AND DIATRIZOATE SODIUM 30 ML: 660; 100 SOLUTION ORAL; RECTAL at 10:12

## 2024-12-19 ENCOUNTER — ANESTHESIA (OUTPATIENT)
Dept: ENDOSCOPY | Facility: HOSPITAL | Age: 59
End: 2024-12-19
Payer: COMMERCIAL

## 2024-12-19 ENCOUNTER — ANESTHESIA EVENT (OUTPATIENT)
Dept: ENDOSCOPY | Facility: HOSPITAL | Age: 59
End: 2024-12-19
Payer: COMMERCIAL

## 2024-12-19 ENCOUNTER — HOSPITAL ENCOUNTER (OUTPATIENT)
Facility: HOSPITAL | Age: 59
Discharge: HOME | End: 2024-12-19
Attending: INTERNAL MEDICINE | Admitting: INTERNAL MEDICINE
Payer: COMMERCIAL

## 2024-12-19 VITALS
WEIGHT: 130 LBS | HEART RATE: 65 BPM | SYSTOLIC BLOOD PRESSURE: 117 MMHG | BODY MASS INDEX: 23.04 KG/M2 | RESPIRATION RATE: 15 BRPM | OXYGEN SATURATION: 96 % | HEIGHT: 63 IN | TEMPERATURE: 97 F | DIASTOLIC BLOOD PRESSURE: 80 MMHG

## 2024-12-19 DIAGNOSIS — R11.15 PERSISTENT VOMITING: ICD-10-CM

## 2024-12-19 PROCEDURE — 37000008 HC ANESTHESIA 1ST 15 MINUTES: Performed by: INTERNAL MEDICINE

## 2024-12-19 PROCEDURE — 43239 EGD BIOPSY SINGLE/MULTIPLE: CPT | Performed by: INTERNAL MEDICINE

## 2024-12-19 RX ORDER — LIDOCAINE HYDROCHLORIDE 10 MG/ML
1 INJECTION, SOLUTION EPIDURAL; INFILTRATION; INTRACAUDAL; PERINEURAL ONCE
OUTPATIENT
Start: 2024-12-19 | End: 2024-12-19

## 2024-12-19 RX ORDER — SODIUM CHLORIDE 9 MG/ML
INJECTION, SOLUTION INTRAVENOUS CONTINUOUS
OUTPATIENT
Start: 2024-12-19

## 2024-12-19 RX ORDER — METOCLOPRAMIDE HYDROCHLORIDE 5 MG/ML
10 INJECTION INTRAMUSCULAR; INTRAVENOUS EVERY 10 MIN PRN
Status: DISCONTINUED | OUTPATIENT
Start: 2024-12-19 | End: 2024-12-19 | Stop reason: HOSPADM

## 2024-12-19 RX ORDER — ONDANSETRON HYDROCHLORIDE 2 MG/ML
4 INJECTION, SOLUTION INTRAVENOUS ONCE
Status: DISCONTINUED | OUTPATIENT
Start: 2024-12-19 | End: 2024-12-19 | Stop reason: HOSPADM

## 2024-12-19 NOTE — H&P
"Gastroenterology Note    CC: nausea    HPI 59 y.o. female presents for an EGD today due to persistent nausea and vomiting.  She had a CT done that showed a large hiatal hernia.  She is on Lansoprazole twice a day with moderate control of pyrosis symptoms.  She reports an allergic reaction (face turned red) with Omeprazole and Esomeprazole in the past.  No dysphagia or pain with swallowing.    Past Medical History:   Diagnosis Date    Acid reflux     Allergy     Arthritis     Dry eyes     Dry mouth     Hyperlipidemia     Hypertension          Review of Systems  General ROS: negative for - chills, fever or weight loss  Cardiovascular ROS: no chest pain or dyspnea on exertion  Gastrointestinal ROS: as per hPI    Physical Examination  /74   Pulse 62   Temp 97.3 °F (36.3 °C) (Temporal)   Resp 15   Ht 5' 3" (1.6 m)   Wt 59 kg (130 lb)   SpO2 99%   BMI 23.03 kg/m²   General appearance: alert, cooperative, no distress  HENT: Normocephalic, atraumatic, neck symmetrical, no nasal discharge   Lungs: clear to auscultation bilaterally, symmetric chest wall expansion bilaterally  Heart: regular rate and rhythm without rub; no displacement of the PMI   Abdomen: soft NT ND BS present  Extremities: extremities symmetric; no clubbing, cyanosis, or edema  Neurologic: Alert and oriented X 3, normal strength, normal coordination and gait      Assessment:   GERD  Nausea and vomiting  Hiatal hernia    Plan:  EGD today.      "

## 2024-12-19 NOTE — TRANSFER OF CARE
"Anesthesia Transfer of Care Note    Patient: Amirah Lagos    Procedure(s) Performed: Procedure(s) (LRB):  EGD (N/A)    Patient location: GI    Anesthesia Type: MAC    Transport from OR: Transported from OR on room air with adequate spontaneous ventilation    Post pain: adequate analgesia    Post assessment: no apparent anesthetic complications    Post vital signs: stable    Level of consciousness: awake    Nausea/Vomiting: no nausea/vomiting    Complications: none    Transfer of care protocol was followed    Last vitals: Visit Vitals  /74   Pulse 62   Temp 36.3 °C (97.3 °F) (Temporal)   Resp 15   Ht 5' 3" (1.6 m)   Wt 59 kg (130 lb)   SpO2 99%   BMI 23.03 kg/m²     "

## 2024-12-19 NOTE — PROVATION PATIENT INSTRUCTIONS
Discharge Summary/Instructions after an Endoscopic Procedure  Patient Name: Amirah Lagos  Patient MRN: [id number]  Patient YOB: 1965 Thursday, December 19, 2024  Moose Reed MD  Dear patient,  As a result of recent federal legislation (The Federal Cures Act), you may   receive lab or pathology results from your procedure in your [facility]   account before your physician is able to contact you. Your physician or   their representative will relay the results to you with their   recommendations at their soonest availability.  Thank you,  RESTRICTIONS:  During your procedure today, you received medications for sedation.  These   medications may affect your judgment, balance and coordination.  Therefore,   for 24 hours, you have the following restrictions:   - DO NOT drive a car, operate machinery, make legal/financial decisions,   sign important papers or drink alcohol.    ACTIVITY:  Today: no heavy lifting, straining or running due to procedural   sedation/anesthesia.  The following day: return to full activity including work.  DIET:  Eat and drink normally unless instructed otherwise.     TREATMENT FOR COMMON SIDE EFFECTS:  - Mild abdominal pain, nausea, belching, bloating or excessive gas:  rest,   eat lightly and use a heating pad.  - Sore Throat: treat with throat lozenges and/or gargle with warm salt   water.  - Because air was used during the procedure, expelling large amounts of air   from your rectum or belching is normal.  - If a bowel prep was taken, you may not have a bowel movement for 1-3 days.    This is normal.  SYMPTOMS TO WATCH FOR AND REPORT TO YOUR PHYSICIAN:  1. Abdominal pain or bloating, other than gas cramps.  2. Chest pain.  3. Back pain.  4. Signs of infection such as: chills or fever occurring within 24 hours   after the procedure.  5. Rectal bleeding, which would show as bright red, maroon, or black stools.   (A tablespoon of blood from the rectum is not serious, especially  if   hemorrhoids are present.)  6. Vomiting.  7. Weakness or dizziness.  GO DIRECTLY TO THE NEAREST EMERGENCY ROOM IF YOU HAVE ANY OF THE FOLLOWING:      Difficulty breathing              Chills and/or fever over 101 F   Persistent vomiting and/or vomiting blood   Severe abdominal pain   Severe chest pain   Black, tarry stools   Bleeding- more than one tablespoon   Any other symptom or condition that you feel may need urgent attention  Your doctor recommends these additional instructions:  If any biopsies were taken, your doctors clinic will contact you in 1 to 2   weeks with any results.  - Discharge patient to home.   - Resume previous diet.   - Continue present medications.   - Await pathology results.   - Perform routine esophageal manometry at appointment to be scheduled.   - Refer to a surgeon to discuss hiatal hernia repair and fundoplication   after manometry is resulted.  - Patient has a contact number available for emergencies.  The signs and   symptoms of potential delayed complications were discussed with the   patient.  Return to normal activities tomorrow.  Written discharge   instructions were provided to the patient.  For questions, problems or results please call your physician - Moose Reed MD at Work:  [phone].  [facility] ED at [phone]  IF A COMPLICATION OR EMERGENCY SITUATION ARISES AND YOU ARE UNABLE TO REACH   YOUR PHYSICIAN - GO DIRECTLY TO THE EMERGENCY ROOM.  Moose Reed MD  12/19/2024 9:40:01 AM  This report has been verified and signed electronically.  Dear patient,  As a result of recent federal legislation (The Federal Cures Act), you may   receive lab or pathology results from your procedure in your [facility]   account before your physician is able to contact you. Your physician or   their representative will relay the results to you with their   recommendations at their soonest availability.  Thank you,  [employer]ATION

## 2024-12-19 NOTE — TRANSFER OF CARE
"Anesthesia Transfer of Care Note    Patient: Amirah Lagos    Procedure(s) Performed: Procedure(s) (LRB):  EGD (N/A)    Patient location: GI    Anesthesia Type: MAC    Transport from OR: Transported from OR on room air with adequate spontaneous ventilation    Post pain: adequate analgesia    Post assessment: no apparent anesthetic complications    Post vital signs: stable    Level of consciousness: awake    Nausea/Vomiting: no nausea/vomiting    Complications: none    Transfer of care protocol was followed    Last vitals: Visit Vitals  /80   Pulse 65   Temp 36.3 °C (97.3 °F)   Resp 15   Ht 5' 3" (1.6 m)   Wt 59 kg (130 lb)   SpO2 96%   BMI 23.03 kg/m²     "

## 2024-12-19 NOTE — ANESTHESIA PREPROCEDURE EVALUATION
12/19/2024  Amirah Lagos is a 59 y.o., female w/ h/o abdominal pain. CT scan revealed large Hiatal Hernia with intrathoracic stomach. She presents for EGD.    Other Medical History   Acid reflux Allergy   Arthritis Dry eyes   Dry mouth Hyperlipidemia   Hypertension      Surgical History    CHOLECYSTECTOMY FOOT SURGERY     Problem List  Current as of 12/19/24 0912  Abnormal liver function tests Bursitis of shoulder   Disturbance of salivary secretion Fibrositis   Knee pain, left Rheumatoid arthritis   Trochanteric bursitis Vitamin D deficiency     H/H: 11/35  PLT: 373      Pre-op Assessment    I have reviewed the Patient Summary Reports.     I have reviewed the Nursing Notes. I have reviewed the NPO Status.   I have reviewed the Medications.     Review of Systems  Anesthesia Hx:  No problems with previous Anesthesia                Social:  Non-Smoker       Cardiovascular:     Hypertension                                          Hepatic/GI:     GERD                Neurological:    Neuromuscular Disease,                                       Physical Exam  General: Well nourished, Cooperative, Alert and Oriented    Airway:  Mallampati: II   Mouth Opening: Normal  TM Distance: Normal  Tongue: Normal  Neck ROM: Normal ROM    Dental:  Intact    Chest/Lungs:  Clear to auscultation, Normal Respiratory Rate    Heart:  Rate: Normal  Rhythm: Regular Rhythm  Sounds: Normal    Abdomen:  Normal, Soft, Nontender        Anesthesia Plan  Type of Anesthesia, risks & benefits discussed:    Anesthesia Type: MAC  Intra-op Monitoring Plan: Standard ASA Monitors  Post Op Pain Control Plan: multimodal analgesia  Induction:  IV  Airway Plan: Direct  Informed Consent: Informed consent signed with the Patient and all parties understand the risks and agree with anesthesia plan.  All questions answered.   ASA Score: 2  Day of Surgery  Review of History & Physical: H&P Update referred to the surgeon/provider.    Ready For Surgery From Anesthesia Perspective.     .

## 2024-12-19 NOTE — ANESTHESIA POSTPROCEDURE EVALUATION
Anesthesia Post Evaluation    Patient: Amirah Lagos    Procedure(s) Performed: Procedure(s) (LRB):  EGD (N/A)    Final Anesthesia Type: MAC      Patient location during evaluation: PACU  Patient participation: Yes- Able to Participate  Level of consciousness: awake and alert  Post-procedure vital signs: reviewed and stable  Pain management: adequate  Airway patency: patent  JUD mitigation strategies: Extubation and recovery carried out in lateral, semiupright, or other nonsupine position  PONV status at discharge: No PONV  Anesthetic complications: no      Cardiovascular status: blood pressure returned to baseline  Respiratory status: room air and unassisted  Hydration status: euvolemic  Follow-up not needed.  Comments: PT. Appears to have adequately recovered for Anesthetic. VSS, airway patient. No apparent complications noted.              Vitals Value Taken Time   /80 12/19/24 0941   Temp  12/19/24 1100   Pulse 65 12/19/24 0941   Resp 15 12/19/24 0941   SpO2 96 % 12/19/24 0941         No case tracking events are documented in the log.      Pain/Nimo Score: Nimo Score: 9 (12/19/2024  9:31 AM)

## 2024-12-20 LAB — PSYCHE PATHOLOGY RESULT: NORMAL

## 2025-01-03 ENCOUNTER — HOSPITAL ENCOUNTER (OUTPATIENT)
Facility: HOSPITAL | Age: 60
Discharge: HOME | End: 2025-01-03
Attending: INTERNAL MEDICINE | Admitting: INTERNAL MEDICINE
Payer: COMMERCIAL

## 2025-01-03 VITALS
WEIGHT: 122 LBS | HEART RATE: 72 BPM | RESPIRATION RATE: 17 BRPM | BODY MASS INDEX: 21.62 KG/M2 | TEMPERATURE: 97 F | OXYGEN SATURATION: 97 % | HEIGHT: 63 IN | SYSTOLIC BLOOD PRESSURE: 112 MMHG | DIASTOLIC BLOOD PRESSURE: 82 MMHG

## 2025-01-03 RX ORDER — CALC/MAG/B COMPLEX/D3/HERB 61
15 TABLET ORAL 2 TIMES DAILY
COMMUNITY

## 2025-01-03 NOTE — NURSING
"Patient not able to tolerate Intubtion x 2 attempts of Esophageal Manometry Probe. No resistance or complications of anatomy for advancing probe, offered patient rest and 3rd intubation attempt, and was denied by patient stating "she will not be able to tolerate procedure".   Discharge instructions given with good verbal understanding of content. Dr. Reed's Office notifed by Voicemail of Finding, patient discharged ambulatory to personal vehicle. DABRN   "

## (undated) DEVICE — KIT CANIST SUCTION 1200CC

## (undated) DEVICE — SOL IRRI STRL WATER 1000ML

## (undated) DEVICE — TIP SUCTION YANKAUER

## (undated) DEVICE — MANOSCAN

## (undated) DEVICE — TUBING O2 FEMALE CONN 13FT

## (undated) DEVICE — KIT SURGICAL COLON .25 1.1OZ

## (undated) DEVICE — COLLECTION SPECIMEN NEPTUNE

## (undated) DEVICE — CYLINDER 1000 ML GRADUATED

## (undated) DEVICE — BLOCK BLOX BITE DENT RIM 54FR